# Patient Record
Sex: MALE | Race: OTHER
[De-identification: names, ages, dates, MRNs, and addresses within clinical notes are randomized per-mention and may not be internally consistent; named-entity substitution may affect disease eponyms.]

---

## 2019-01-28 ENCOUNTER — HOSPITAL ENCOUNTER (EMERGENCY)
Dept: HOSPITAL 76 - ED | Age: 24
Discharge: HOME | End: 2019-01-28
Payer: MEDICAID

## 2019-01-28 VITALS — DIASTOLIC BLOOD PRESSURE: 89 MMHG | SYSTOLIC BLOOD PRESSURE: 131 MMHG

## 2019-01-28 DIAGNOSIS — M77.8: Primary | ICD-10-CM

## 2019-01-28 DIAGNOSIS — G56.01: ICD-10-CM

## 2019-01-28 PROCEDURE — 36415 COLL VENOUS BLD VENIPUNCTURE: CPT

## 2019-01-28 PROCEDURE — 99283 EMERGENCY DEPT VISIT LOW MDM: CPT

## 2019-01-28 PROCEDURE — 84443 ASSAY THYROID STIM HORMONE: CPT

## 2019-01-28 PROCEDURE — 73110 X-RAY EXAM OF WRIST: CPT

## 2019-01-28 NOTE — XRAY REPORT
Reason:  numbness, swelling right hand; ? carpal tunnel syn

Procedure Date:  01/28/2019   

Accession Number:  [id number] / [id number]                    

Procedure:  XR  - Wrist 3 View RT CPT Code:  

 

FULL RESULT:

 

 

EXAM:

RIGHT WRIST RADIOGRAPHY

 

EXAM DATE: 1/28/2019 08:25 AM.

 

CLINICAL HISTORY: Numbness, swelling right hand; clinical suspicion for 

carpal tunnel syn.

 

COMPARISON: None.

 

TECHNIQUE: 3 views.

 

FINDINGS:

Bones: There is a broad base bony spur on the radial side of the distal 

metaphysis of the radius, 3 x 7 mm without associated adjacent soft 

tissue swelling. No fractures or bony destructive lesion.

 

Joints: No significant degenerative process. No subluxations.

 

Soft Tissues: No soft tissue calcification or soft tissue swelling.

IMPRESSION: A small broad-based bony spur on the radial side of the 

distal metaphysis of the radius without soft tissue swelling; otherwise, 

negative wrist radiography.

 

[facility]

## 2019-01-28 NOTE — ED PHYSICIAN DOCUMENTATION
PD HPI UPPER EXT INJURY





- Stated complaint


Stated Complaint: HAND PX





- Chief complaint


Chief Complaint: Ext Problem





- History obtained from


History obtained from: Patient





- History of Present Illness


Location: Right, Wrist, Hand


Type of injury: Other (No specific injury.)


Timing - duration: Weeks (1)


Timing - details: Waxing and waning


Associated symptoms: Tingling, Swelling


Similar symptoms before: Diagnosis (Diagnosed with carpal tunnel syndrome about 

two years ago.)





- Additonal information


Additional information: 





The patient is a 23-year-old male who presents with swelling of his right hand, 

waxing and waning for the past week.  It is worse at night.  He notices that his

hand goes numb, particularly in the little finger.  It is similar to symptoms he

had about 2 years ago when he was diagnosed with carpal tunnel syndrome.  He is 

right-hand dominant.  He is employed as a .





Review of Systems


Constitutional: denies: Fever


Nose: denies: Congestion


Throat: denies: Sore throat


Cardiac: denies: Chest pain / pressure


Respiratory: denies: Dyspnea, Cough


GI: denies: Abdominal Pain, Nausea, Vomiting


Skin: denies: Rash


Musculoskeletal: reports: Extremity swelling (right hand).  denies: Neck pain, 

Back pain


Neurologic: reports: Numbness (Intermittently right hand.).  denies: Headache





PD PAST MEDICAL HISTORY





- Past Medical History


Neuro: None


Endocrine/Autoimmune: None





- Present Medications


Home Medications: 


                                Ambulatory Orders











 Medication  Instructions  Recorded  Confirmed


 


Naproxen [Naprosyn] 500 mg PO BID #30 tablet 01/28/19 














- Allergies


Allergies/Adverse Reactions: 


                                    Allergies











Allergy/AdvReac Type Severity Reaction Status Date / Time


 


No Known Drug Allergies Allergy   Verified 01/28/19 08:08














PD ED PE NORMAL





- Vitals


Vital signs reviewed: Yes (normal)





- General


General: Alert and oriented X 3, Well developed/nourished





- HEENT


HEENT: Atraumatic





- Neck


Neck: No adenopathy





- Cardiac


Cardiac: RRR





- Respiratory


Respiratory: No respiratory distress, Clear bilaterally





- Derm


Derm: No rash





- Extremities


Extremities: No deformity, No tenderness to palpate, Other (There is no obvious 

swelling detected of the right hand, and no focal tenderness to palpation.  

Positive Phalen sign.  Negative Tinel's test.  No motor deficit detected.)





- Neuro


Neuro: Alert and oriented X 3, No motor deficit, No sensory deficit, Normal 

speech





Results





- Vitals


Vitals: 


                                     Oxygen











O2 Source                      Room air

















- Labs


Labs: 


                                Laboratory Tests











  01/28/19





  08:30


 


TSH  1.78














- Rads (name of study)


  ** right wrist


Radiology: Prelim report reviewed, EMP read contemporaneously, See rad report (A

 small broad-based bony spur on the radial side of the distal metaphysis of the 

radius, without soft tissue swelling.  Otherwise negative wrist radiography.)





PD MEDICAL DECISION MAKING





- ED course


Complexity details: reviewed results, re-evaluated patient, considered 

differential, d/w patient


ED course: 





The patient's presentation is suggestive of carpal tunnel syndrome, although his

 exam is by no means definitive.  X-ray of the right wrist reveals a bone spur 

on the radial aspect of the distal radius, and it is unclear whether that plays 

a role in the patient's symptoms.  His TSH is normal.


Treatment in the emergency department included administration of Naprosyn 500 mg

 orally, and application of a Velcro wrist splint.  I discussed with him 

symptomatic treatment, outpatient follow-up, as well as potentially worrisome 

signs or symptoms that should prompt reevaluation in the emergency department.





Departure





- Departure


Disposition: 01 Home, Self Care


Clinical Impression: 


 Bone spur, Carpal tunnel syndrome of right wrist





Condition: Stable


Instructions:  ED Carpal Tunnel


Follow-Up: 


[facility] [Provider Group]


Prescriptions: 


Naproxen [Naprosyn] 500 mg PO BID #30 tablet


Comments: 


You can wear the Velcro wrist splint if it provides comfort.


Take Naprosyn twice daily as prescribed.


Follow-up with orthopedics.  Call to schedule a follow-up appointment.


Return to the emergency department if you develop increasing pain, persistent 

numbness or weakness, or otherwise worsening symptoms.


Discharge Date/Time: 01/28/19 09:42

## 2021-04-13 ENCOUNTER — HOSPITAL ENCOUNTER (OUTPATIENT)
Dept: HOSPITAL 76 - DI.N | Age: 26
Discharge: HOME | End: 2021-04-13
Attending: NURSE PRACTITIONER
Payer: MEDICAID

## 2021-04-13 ENCOUNTER — HOSPITAL ENCOUNTER (OUTPATIENT)
Dept: HOSPITAL 76 - LAB.N | Age: 26
Discharge: HOME | End: 2021-04-13
Attending: NURSE PRACTITIONER
Payer: MEDICAID

## 2021-04-13 DIAGNOSIS — M54.2: Primary | ICD-10-CM

## 2021-04-13 DIAGNOSIS — Z11.3: ICD-10-CM

## 2021-04-13 DIAGNOSIS — Z11.3: Primary | ICD-10-CM

## 2021-04-13 PROCEDURE — 87661 TRICHOMONAS VAGINALIS AMPLIF: CPT

## 2021-04-13 PROCEDURE — 86592 SYPHILIS TEST NON-TREP QUAL: CPT

## 2021-04-13 PROCEDURE — 87389 HIV-1 AG W/HIV-1&-2 AB AG IA: CPT

## 2021-04-13 PROCEDURE — 36415 COLL VENOUS BLD VENIPUNCTURE: CPT

## 2021-04-13 PROCEDURE — 86803 HEPATITIS C AB TEST: CPT

## 2021-04-13 PROCEDURE — 87591 N.GONORRHOEAE DNA AMP PROB: CPT

## 2021-04-13 PROCEDURE — 87491 CHLMYD TRACH DNA AMP PROBE: CPT

## 2021-04-13 NOTE — XRAY REPORT
PROCEDURE:  Cervical Spine 2 View

 

INDICATIONS:  NECK PX

 

TECHNIQUE:  3 view(s) of the cervical spine were acquired.  

 

COMPARISON:  None.

 

FINDINGS:  

 

Bones:  No fractures or dislocations to the C7-T1 level.  Straightening of normal cervical lordosis i
s seen. The lateral masses of C1 appear intact on the odontoid view.  No suspicious bony lesions.  

 

Soft tissues:  No prevertebral soft tissue swelling.  

 

IMPRESSION:  Straightening of normal cervical lordosis. No compression fracture or spondylolisthesis.


 

Reviewed by: Abundio Campbell MD on 4/13/2021 6:14 PM ELAINE

Approved by: Abundio Campbell MD on 4/13/2021 6:14 PM ELAINE

 

 

Station ID:  [id number]

## 2021-04-14 LAB
C TRACH DNA SPEC NAA+PROBE-ACNC: NEGATIVE
N GONORRHOEA DNA GENITAL QL NAA+PROBE: NEGATIVE
T VAGINALIS RRNA GENITAL QL PROBE: (no result)

## 2021-04-15 LAB
HEPATITIS C ANTIBODY: (no result)
HIV AG/AB 4TH GEN: (no result)
SIGNAL TO CUT-OFF: 0 (ref ?–1)

## 2022-10-25 ENCOUNTER — HOSPITAL ENCOUNTER (OUTPATIENT)
Dept: HOSPITAL 76 - DI | Age: 27
Discharge: HOME | End: 2022-10-25
Attending: PHYSICIAN ASSISTANT
Payer: MEDICAID

## 2022-10-25 DIAGNOSIS — R51.9: Primary | ICD-10-CM

## 2022-10-25 PROCEDURE — 70553 MRI BRAIN STEM W/O & W/DYE: CPT

## 2022-10-25 NOTE — MRI REPORT
PROCEDURE:  BRAIN W/WO

 

INDICATIONS:  MIXED HEADACHE

 

CONTRAST: GADAVIST 8.2ML 

                       

TECHNIQUE:  

Noncontrast axial T1 spin echo, axial T2 fast spin echo, sagittal and axial FLAIR, coronal T2 fast sp
in echo, axial gradient echo, axial diffusion and ADC through the brain.  After the administration of
 contrast, axial and coronal T1 spin echo with fat saturation through the brain.  

 

COMPARISON:  None.

 

FINDINGS:  

Image quality:  Excellent.  

 

CSF spaces:  Basal cisterns are patent.  No extra-axial fluid collections.  Ventricles are normal in 
size and shape.  

 

Brain:  No midline shift.  No intracranial bleeds or masses.  No abnormal intracranial enhancement.  
There is cerebral volume loss for age.  There is periventricular white matter chronic small vessel is
chemic change.  The brainstem appears normal.  Diffusion-weighted images demonstrate no acute ischemi
c insults.  No chronic ischemic insults.  Normal intravascular flow voids are present.  

 

Skull and face:  Calvarial marrow is normal in signal.  Orbits appear normal.  

 

Sinuses:  Sinuses and mastoids appear clear.  

 

 

 

 

IMPRESSION:  

 

A cause of headache cannot be seen on these images.

 

No masses or abnormal enhancement can be seen.  

 

Reviewed by: Jose Alfredo Lazcano MD on 10/25/2022 5:13 PM ELAINE

Approved by: Jose Alfredo Lazcano MD on 10/25/2022 5:13 PM ELAINE

 

 

Station ID:  [id number]

## 2023-02-27 ENCOUNTER — HOSPITAL ENCOUNTER (EMERGENCY)
Dept: HOSPITAL 76 - ED | Age: 28
Discharge: HOME | End: 2023-02-27
Payer: MEDICAID

## 2023-02-27 VITALS — DIASTOLIC BLOOD PRESSURE: 81 MMHG | SYSTOLIC BLOOD PRESSURE: 135 MMHG
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
## 2023-02-27 DIAGNOSIS — S60.10XA: Primary | ICD-10-CM

## 2023-02-27 DIAGNOSIS — F17.200: ICD-10-CM

## 2023-02-27 PROCEDURE — 80053 COMPREHEN METABOLIC PANEL: CPT

## 2023-02-27 PROCEDURE — 85025 COMPLETE CBC W/AUTO DIFF WBC: CPT

## 2023-02-27 PROCEDURE — 83690 ASSAY OF LIPASE: CPT

## 2023-02-27 PROCEDURE — 99283 EMERGENCY DEPT VISIT LOW MDM: CPT

## 2023-02-27 PROCEDURE — 11740 EVACUATION SUBUNGUAL HMTMA: CPT

## 2023-02-27 RX ADMIN — LIDOCAINE HYDROCHLORIDE ONE: 20 INJECTION, SOLUTION INFILTRATION; PERINEURAL at 20:27

## 2023-02-27 RX ADMIN — LIDOCAINE HYDROCHLORIDE ONE ML: 20 INJECTION, SOLUTION INFILTRATION; PERINEURAL at 20:21

## 2023-02-27 NOTE — XRAY REPORT
PROCEDURE:  Finger(s) RT

 

INDICATIONS:  R finger pain vs shelf

 

TECHNIQUE:  AP hand, 2 views of the fifth finger(s) acquired.  

 

COMPARISON:  Right wrist radiographs 1/28/2019.

 

FINDINGS:  

 

Bones:  No fractures or dislocations.  No suspicious bony lesions.  

 

Soft tissues:  No suspicious soft tissue calcifications.  

 

IMPRESSION:  

No acute osseous abnormality.

 

Reviewed by: Jaycob Becerra MD on 2/27/2023 7:22 PM PST

Approved by: Jaycob Becerra MD on 2/27/2023 7:22 PM PST

 

 

Station ID:  [id number]

## 2023-02-27 NOTE — ED PHYSICIAN DOCUMENTATION
PD HPI UPPER EXT INJURY





- Stated complaint


Stated Complaint: Finger Pain





- Chief complaint


Chief Complaint: Ext Problem





- History obtained from


History obtained from: Patient





- History of Present Illness


Location: Right, Finger (5th)


Pain level max: 7


Pain level now: 6


Improved by: Nothing


Worsened by: Moving, Palpating


Associated symptoms: No: Weakness, Numbness, Tingling


Contributing factors: No: Anticoagulated





- Additonal information


Additional information: 





27-year-old male presents to the emergency department with a crush injury to the

distal tip of the right index finger. This occurred several hours prior to 

arrival. He is continuing to have pain. Patient is right handed.





Review of Systems


Constitutional: denies: Fever, Chills


Skin: denies: Rash





PD PAST MEDICAL HISTORY





- Past Medical History


Past Medical History: No


Neuro: None


Endocrine/Autoimmune: None





- Past Surgical History


Past Surgical History: Yes





- Present Medications


Home Medications: 


                                Ambulatory Orders











 Medication  Instructions  Recorded  Confirmed


 


No Known Home Medications  02/27/23 02/27/23














- Allergies


Allergies/Adverse Reactions: 


                                    Allergies











Allergy/AdvReac Type Severity Reaction Status Date / Time


 


No Known Drug Allergies Allergy   Verified 01/28/19 08:08














- Social History


Does the pt smoke?: Yes


Smoking Status: Current every day smoker


Substance Use and Type: Marijuana





- Immunizations


Immunizations are current?: Yes





PD ED PE NORMAL





- Vitals


Vital signs reviewed: Yes





- General


General: Alert and oriented X 3, No acute distress





- Derm


Derm: Warm and dry





- Extremities


Extremities: Other (R 5th digit - subungual hematoma present covering the entire

nail. NVI. no deformity. FROM. )





- Neuro


Neuro: Alert and oriented X 3





Results





- Vitals


Vitals: 


                               Vital Signs - 24 hr











  02/27/23 02/27/23





  17:38 20:26


 


Temperature 36.5 C 36.6 C


 


Heart Rate 65 62


 


Respiratory 12 16





Rate  


 


Blood Pressure 129/80 135/81 H


 


O2 Saturation 100 98








                                     Oxygen











O2 Source                      Room air

















- Rads (name of study)


  ** R 5th digit xray


Radiology: Final report received, See rad report





Procedures





- General procedure


General procedure: 





R 5th digit  - Lidocaine was used as a digital block on the right fifth digit. 

Excellent anesthesia achieved. Electrocautery was used to trephinate the nail 

and the subungual hematoma was evacuated. Patient tolerated well. No 

complications.





PD Medical Decision Making





- ED course


Complexity details: considered differential, d/w patient


ED course: 





Patient with a right 5th digit subungual hematoma. This was evacuated. Tolerated

well. No acute findings on x-ray. No evidence of fracture. Will continue warm 

water soaks at home and infection precautions given.Patient counseled regarding 

signs and symptoms for which I believe an urgent reevaluation would be 

necessary. Patient with good understanding of and agreement to plan and is 

comfortable going home at this time.





Departure





- Departure


Disposition: 01 Home, Self Care


Clinical Impression: 


Subungual hematoma of finger


Qualifiers:


 Encounter type: initial encounter Qualified Code(s): S60.10XA - Contusion of 

unspecified finger with damage to nail, initial encounter





Condition: Good


Instructions:  ED Hematoma Subungual


Follow-Up: 


Sondra Du PA [Primary Care Provider] - As Needed


Comments: 


Please follow-up with your doctor as needed for further care.  Please soak the 

area in warm water 2-3 times daily to encourage it to continue to drain.  You 

can use Motrin or Tylenol as needed for pain.  Return for any redness, swelling 

or drainage from the wound.


Discharge Date/Time: 02/27/23 21:07

## 2023-08-11 ENCOUNTER — HOSPITAL ENCOUNTER (EMERGENCY)
Dept: HOSPITAL 76 - ED | Age: 28
Discharge: HOME | End: 2023-08-11
Payer: MEDICAID

## 2023-08-11 VITALS — SYSTOLIC BLOOD PRESSURE: 144 MMHG | OXYGEN SATURATION: 98 % | DIASTOLIC BLOOD PRESSURE: 76 MMHG

## 2023-08-11 DIAGNOSIS — F17.200: ICD-10-CM

## 2023-08-11 DIAGNOSIS — U07.1: Primary | ICD-10-CM

## 2023-08-11 PROCEDURE — 99282 EMERGENCY DEPT VISIT SF MDM: CPT

## 2023-08-11 PROCEDURE — 99283 EMERGENCY DEPT VISIT LOW MDM: CPT

## 2023-08-11 NOTE — ED PHYSICIAN DOCUMENTATION
History of Present Illness





- Stated complaint


Stated Complaint: C+/N/ABD PX/CHILLS





- Chief complaint


Chief Complaint: Resp





- History obtained from


History obtained from: Patient





- Additonal information


Additional information: 





Otherwise healthy young person got sick 3 days ago with symptomatic COVID after 

traveling.  His domestic partner is also sick with COVID.  Symptoms include 

laryngitis, congestion not responsive to Sudafed and nausea and diarrhea.  No 

comorbidities.





PD PAST MEDICAL HISTORY





- Past Medical History


Neuro: None


Endocrine/Autoimmune: None





- Past Surgical History


Past Surgical History: Yes





- Present Medications


Home Medications: 


                                Ambulatory Orders











 Medication  Instructions  Recorded  Confirmed


 


Ibuprofen [Motrin] 800 mg PO Q8H PRN #30 tablet 08/11/23 


 


Loperamide [Imodium] 2 mg PO QID PRN #10 cap 08/11/23 


 


Ondansetron Odt [Zofran] 4 mg TL Q6H PRN #10 tablet 08/11/23 














- Allergies


Allergies/Adverse Reactions: 


                                    Allergies











Allergy/AdvReac Type Severity Reaction Status Date / Time


 


No Known Drug Allergies Allergy   Verified 01/28/19 08:08














- Social History


Does the pt smoke?: Yes


Smoking Status: Current every day smoker





- Immunizations


Immunizations are current?: Yes





PD ED PE NORMAL





- Vitals


Vital signs reviewed: Yes





- General


General: Alert and oriented X 3, No acute distress





- HEENT


HEENT: Pharynx benign





- Neck


Neck: Supple, no meningeal sign, No bony TTP





- Cardiac


Cardiac: RRR, No murmur





- Respiratory


Respiratory: No respiratory distress, Clear bilaterally





- Abdomen


Abdomen: Non tender





- Derm


Derm: No rash





- Neuro


Neuro: Alert and oriented X 3, Normal speech





Results





- Vitals


Vitals: 





                               Vital Signs - 24 hr











  08/11/23





  14:33


 


Temperature 36 C L


 


Heart Rate 76


 


Respiratory 20





Rate 


 


Blood Pressure 144/76 H


 


O2 Saturation 98








                                     Oxygen











O2 Source                      Room air

















PD Medical Decision Making





- ED course


ED course: 





We discussed paxlovid but after discussion he would like to forego it and just 

get symptomatic treatments.  He does need a note for work and was given a dose 

of Decadron here.  This was for the laryngitis.





Departure





- Departure


Disposition: 01 Home, Self Care


Clinical Impression: 


 COVID-19





Condition: Good


Record reviewed to determine appropriate education?: Yes


Instructions:  ED Viral Syndrome


Prescriptions: 


Loperamide [Imodium] 2 mg PO QID PRN #10 cap


 PRN Reason: Diarrhea


Ibuprofen [Motrin] 800 mg PO Q8H PRN #30 tablet


 PRN Reason: PAIN &/OR FEVER


Ondansetron Odt [Zofran] 4 mg TL Q6H PRN #10 tablet


 PRN Reason: Nausea / Vomiting


Comments: 


Return for new or worsening symptoms.  Follow-up with your doctor if not 

improving over the next 3 to 5 days.


Forms:  PCP List, Activity restrictions

## 2024-06-12 ENCOUNTER — HOSPITAL ENCOUNTER (EMERGENCY)
Dept: HOSPITAL 76 - ED | Age: 29
Discharge: HOME | End: 2024-06-12
Payer: COMMERCIAL

## 2024-06-12 VITALS — OXYGEN SATURATION: 95 % | DIASTOLIC BLOOD PRESSURE: 89 MMHG | SYSTOLIC BLOOD PRESSURE: 141 MMHG

## 2024-06-12 DIAGNOSIS — F17.200: ICD-10-CM

## 2024-06-12 DIAGNOSIS — J20.9: Primary | ICD-10-CM

## 2024-06-12 PROCEDURE — 99284 EMERGENCY DEPT VISIT MOD MDM: CPT

## 2024-06-12 PROCEDURE — 94640 AIRWAY INHALATION TREATMENT: CPT

## 2024-06-12 PROCEDURE — 71046 X-RAY EXAM CHEST 2 VIEWS: CPT

## 2024-06-12 PROCEDURE — 99283 EMERGENCY DEPT VISIT LOW MDM: CPT

## 2024-06-12 RX ADMIN — ALBUTEROL SULFATE STA MG: 2.5 SOLUTION RESPIRATORY (INHALATION) at 22:30

## 2024-06-12 NOTE — XRAY REPORT
PROCEDURE:  Chest 2V

 

INDICATIONS:  cough wheeze

 

TECHNIQUE:  2 views of the chest were acquired.  

 

COMPARISON:  None.

 

FINDINGS:  

 

Surgical changes and devices:  None.  

 

Lungs and pleura:  No pleural effusions or pneumothorax.  Lungs are clear.  

 

Mediastinum:  Mediastinal contours appear normal.  Heart size is normal.  

 

Bones and chest wall:  No suspicious bony lesions.  Overlying soft tissues appear unremarkable.  

 

 

IMPRESSION:  

 

No acute cardiopulmonary process.

 

 

 

Reviewed by: Maria Luisa Pillai MD, PhD on 6/12/2024 10:54 PM PDT

Approved by: Maria Luisa Pillai MD, PhD on 6/12/2024 10:54 PM PDT

 

 

Station ID:  [id number]

## 2024-06-12 NOTE — ED PHYSICIAN DOCUMENTATION
History of Present Illness





- Stated complaint


Stated Complaint: SOA





- Chief complaint


Chief Complaint: Heent





- History obtained from


History obtained from: Patient





- Additonal information


Additional information: 





Otherwise healthy 28-year-old male has been sick for about 2 weeks with 

minimally productive cough and shortness of breath that is worsening.  He has no

personal history of asthma but his mother has not.  His brother was sick 

recently with a similar illness.  No fevers.





PD PAST MEDICAL HISTORY





- Past Medical History


Past Medical History: No


Neuro: None


Endocrine/Autoimmune: None





- Past Surgical History


Past Surgical History: Yes





- Present Medications


Home Medications: 


                                Ambulatory Orders











 Medication  Instructions  Recorded  Confirmed


 


Ibuprofen [Motrin] 800 mg PO Q8H PRN #30 tablet 08/11/23 


 


Albuterol Sulf [Ventolin Hfa 1 - 2 puffs INH Q4HR PRN #1 each 06/12/24 





Inhaler]   


 


guaiFENesin/CODEINE [Robitussin AC] 5 - 10 ml PO Q6H PRN #120 ml 06/12/24 


 


predniSONE [Deltasone] 20 mg PO [id number] #21 tab 06/12/24 














- Allergies


Allergies/Adverse Reactions: 


                                    Allergies











Allergy/AdvReac Type Severity Reaction Status Date / Time


 


No Known Drug Allergies Allergy   Verified 06/12/24 22:06














- Social History


Does the pt smoke?: Yes


Smoking Status: Current every day smoker


Does the pt drink ETOH?: Yes





- Immunizations


Immunizations are current?: Yes





PD ED PE NORMAL





- Vitals


Vital signs reviewed: Yes





- General


General: Alert and oriented X 3 (He actually looks mildly breathless)





- HEENT


HEENT: Pharynx benign





- Neck


Neck: Supple, no meningeal sign, No bony TTP





- Cardiac


Cardiac: RRR, No murmur





- Respiratory


Respiratory: Other (Mildly breathless but speaking in full sentences with 

diffuse expiratory wheezes, no focal findings.)





- Extremities


Extremities: No edema, No calf tenderness / cord





- Neuro


Neuro: Alert and oriented X 3





Results





- Vitals


Vitals: 


                               Vital Signs - 24 hr











  06/12/24 06/12/24 06/12/24





  22:06 22:17 22:56


 


Temperature 36.3 C L  


 


Heart Rate 86  


 


Respiratory 17 17 17





Rate   


 


Blood Pressure 141/89 H  


 


O2 Saturation 95  








                                     Oxygen











O2 Source                      Room air

















- Rads (name of study)


  ** 2 view chest x-ray is unremarkable


Relevant Findings:: Final report received, EMP independent interpretation of 

test





PD Medical Decision Making





- ED course


ED course: 





28-year-old presents with acute wheezy bronchitis.  Chest x-ray is clear and 

nothing focal on the exam to suggest a bacterial etiology.  He was administered 

albuterol and steroids and after the albuterol he was feeling better.  Still a 

bit wheezy but declined a second neb.





Departure





- Departure


Disposition: 01 Home, Self Care


Clinical Impression: 


 Acute wheezy bronchitis





Condition: Good


Record reviewed to determine appropriate education?: Yes


Instructions:  ED Bronchitis Asthmatic


Prescriptions: 


Albuterol Sulf [Ventolin Hfa Inhaler] 1 - 2 puffs INH Q4HR PRN #1 each


 PRN Reason: Shortness Of Air/Wheezing


predniSONE [Deltasone] 20 mg PO [id number] #21 tab


guaiFENesin/CODEINE [Robitussin AC] 5 - 10 ml PO Q6H PRN #120 ml


 PRN Reason: Cough


Comments: 


I sent your prescription electronically to [facility] in [place].  Do not 

drink or drive while taking codeine as it can be slightly sedating.  Call your 

doctor to arrange a follow-up appointment, make the next available appointment. 

In the interim, return anytime if worse or if new symptoms develop.


Forms:  PCP List